# Patient Record
Sex: FEMALE | Race: WHITE | NOT HISPANIC OR LATINO
[De-identification: names, ages, dates, MRNs, and addresses within clinical notes are randomized per-mention and may not be internally consistent; named-entity substitution may affect disease eponyms.]

---

## 2017-01-27 ENCOUNTER — CHARTING TRANS (OUTPATIENT)
Dept: OTHER | Age: 77
End: 2017-01-27

## 2017-03-27 ENCOUNTER — CHARTING TRANS (OUTPATIENT)
Dept: OTHER | Age: 77
End: 2017-03-27

## 2017-05-05 ENCOUNTER — CHARTING TRANS (OUTPATIENT)
Dept: OTHER | Age: 77
End: 2017-05-05

## 2017-07-10 ENCOUNTER — CHARTING TRANS (OUTPATIENT)
Dept: OTHER | Age: 77
End: 2017-07-10

## 2017-08-15 PROCEDURE — 87086 URINE CULTURE/COLONY COUNT: CPT | Performed by: INTERNAL MEDICINE

## 2017-08-29 ENCOUNTER — CHARTING TRANS (OUTPATIENT)
Dept: OTHER | Age: 77
End: 2017-08-29

## 2017-09-01 ENCOUNTER — CHARTING TRANS (OUTPATIENT)
Dept: OTHER | Age: 77
End: 2017-09-01

## 2017-10-17 PROBLEM — Z86.73 HISTORY OF TRANSIENT ISCHEMIC ATTACK (TIA): Status: ACTIVE | Noted: 2017-10-17

## 2017-11-06 ENCOUNTER — CHARTING TRANS (OUTPATIENT)
Dept: OTHER | Age: 77
End: 2017-11-06

## 2017-11-13 PROCEDURE — 80061 LIPID PANEL: CPT | Performed by: INTERNAL MEDICINE

## 2018-01-01 ENCOUNTER — APPOINTMENT (OUTPATIENT)
Dept: CT IMAGING | Facility: HOSPITAL | Age: 78
DRG: 644 | End: 2018-01-01
Attending: EMERGENCY MEDICINE
Payer: MEDICARE

## 2018-01-01 ENCOUNTER — TELEPHONE (OUTPATIENT)
Dept: CASE MANAGEMENT | Facility: HOSPITAL | Age: 78
End: 2018-01-01

## 2018-01-01 ENCOUNTER — HOSPITAL ENCOUNTER (EMERGENCY)
Facility: HOSPITAL | Age: 78
End: 2018-01-01
Attending: EMERGENCY MEDICINE
Payer: MEDICARE

## 2018-01-01 ENCOUNTER — APPOINTMENT (OUTPATIENT)
Dept: ULTRASOUND IMAGING | Facility: HOSPITAL | Age: 78
DRG: 644 | End: 2018-01-01
Attending: OTOLARYNGOLOGY
Payer: MEDICARE

## 2018-01-01 ENCOUNTER — NURSE ONLY (OUTPATIENT)
Dept: LAB | Age: 78
End: 2018-01-01
Attending: INTERNAL MEDICINE
Payer: MEDICARE

## 2018-01-01 ENCOUNTER — APPOINTMENT (OUTPATIENT)
Dept: ULTRASOUND IMAGING | Facility: HOSPITAL | Age: 78
DRG: 644 | End: 2018-01-01
Attending: INTERNAL MEDICINE
Payer: MEDICARE

## 2018-01-01 ENCOUNTER — APPOINTMENT (OUTPATIENT)
Dept: GENERAL RADIOLOGY | Facility: HOSPITAL | Age: 78
DRG: 644 | End: 2018-01-01
Attending: INTERNAL MEDICINE
Payer: MEDICARE

## 2018-01-01 ENCOUNTER — HOSPITAL ENCOUNTER (INPATIENT)
Facility: HOSPITAL | Age: 78
LOS: 2 days | Discharge: HOME | DRG: 644 | End: 2018-01-01
Attending: INTERNAL MEDICINE | Admitting: INTERNAL MEDICINE
Payer: MEDICARE

## 2018-01-01 ENCOUNTER — HOSPITAL ENCOUNTER (INPATIENT)
Facility: HOSPITAL | Age: 78
LOS: 3 days | Discharge: HOME | DRG: 644 | End: 2018-01-01
Attending: EMERGENCY MEDICINE | Admitting: HOSPITALIST
Payer: MEDICARE

## 2018-01-01 VITALS
OXYGEN SATURATION: 94 % | DIASTOLIC BLOOD PRESSURE: 65 MMHG | WEIGHT: 197.31 LBS | HEART RATE: 94 BPM | SYSTOLIC BLOOD PRESSURE: 152 MMHG | RESPIRATION RATE: 18 BRPM | TEMPERATURE: 98 F | BODY MASS INDEX: 40 KG/M2

## 2018-01-01 VITALS
HEIGHT: 60 IN | HEART RATE: 78 BPM | SYSTOLIC BLOOD PRESSURE: 151 MMHG | WEIGHT: 210.31 LBS | BODY MASS INDEX: 41.29 KG/M2 | DIASTOLIC BLOOD PRESSURE: 63 MMHG | OXYGEN SATURATION: 91 % | RESPIRATION RATE: 20 BRPM | TEMPERATURE: 98 F

## 2018-01-01 DIAGNOSIS — I87.1 SUPERIOR VENA CAVA SYNDROME: ICD-10-CM

## 2018-01-01 DIAGNOSIS — R69 DIAGNOSIS UNKNOWN: Primary | ICD-10-CM

## 2018-01-01 DIAGNOSIS — N28.9 RENAL INSUFFICIENCY: ICD-10-CM

## 2018-01-01 DIAGNOSIS — R91.8 PULMONARY MASS: Primary | ICD-10-CM

## 2018-01-01 DIAGNOSIS — N17.9 AKI (ACUTE KIDNEY INJURY) (HCC): ICD-10-CM

## 2018-01-01 DIAGNOSIS — R09.02 HYPOXIA: ICD-10-CM

## 2018-01-01 DIAGNOSIS — R53.1 WEAKNESS: ICD-10-CM

## 2018-01-01 DIAGNOSIS — D72.829 LEUKOCYTOSIS, UNSPECIFIED TYPE: ICD-10-CM

## 2018-01-01 DIAGNOSIS — E86.0 DEHYDRATION: ICD-10-CM

## 2018-01-01 DIAGNOSIS — I10 HTN (HYPERTENSION): Primary | ICD-10-CM

## 2018-01-01 DIAGNOSIS — I46.9 CARDIAC ARREST (HCC): Primary | ICD-10-CM

## 2018-01-01 DIAGNOSIS — E87.0 HYPERNATREMIA: ICD-10-CM

## 2018-01-01 DIAGNOSIS — I63.9 STROKE (CEREBRUM) (HCC): Primary | ICD-10-CM

## 2018-01-01 DIAGNOSIS — C73 THYROID CARCINOMA (HCC): Primary | ICD-10-CM

## 2018-01-01 PROCEDURE — 83735 ASSAY OF MAGNESIUM: CPT

## 2018-01-01 PROCEDURE — 97535 SELF CARE MNGMENT TRAINING: CPT

## 2018-01-01 PROCEDURE — 85025 COMPLETE CBC W/AUTO DIFF WBC: CPT | Performed by: INTERNAL MEDICINE

## 2018-01-01 PROCEDURE — 97530 THERAPEUTIC ACTIVITIES: CPT

## 2018-01-01 PROCEDURE — 81001 URINALYSIS AUTO W/SCOPE: CPT | Performed by: INTERNAL MEDICINE

## 2018-01-01 PROCEDURE — 88342 IMHCHEM/IMCYTCHM 1ST ANTB: CPT | Performed by: INTERNAL MEDICINE

## 2018-01-01 PROCEDURE — 87086 URINE CULTURE/COLONY COUNT: CPT | Performed by: INTERNAL MEDICINE

## 2018-01-01 PROCEDURE — 99285 EMERGENCY DEPT VISIT HI MDM: CPT

## 2018-01-01 PROCEDURE — 85025 COMPLETE CBC W/AUTO DIFF WBC: CPT | Performed by: EMERGENCY MEDICINE

## 2018-01-01 PROCEDURE — 0CJY8ZZ INSPECTION OF MOUTH AND THROAT, VIA NATURAL OR ARTIFICIAL OPENING ENDOSCOPIC: ICD-10-PCS | Performed by: OTOLARYNGOLOGY

## 2018-01-01 PROCEDURE — 97165 OT EVAL LOW COMPLEX 30 MIN: CPT

## 2018-01-01 PROCEDURE — 36415 COLL VENOUS BLD VENIPUNCTURE: CPT

## 2018-01-01 PROCEDURE — 80048 BASIC METABOLIC PNL TOTAL CA: CPT

## 2018-01-01 PROCEDURE — 81001 URINALYSIS AUTO W/SCOPE: CPT

## 2018-01-01 PROCEDURE — 88381 MICRODISSECTION MANUAL: CPT | Performed by: INTERNAL MEDICINE

## 2018-01-01 PROCEDURE — 96376 TX/PRO/DX INJ SAME DRUG ADON: CPT

## 2018-01-01 PROCEDURE — 80048 BASIC METABOLIC PNL TOTAL CA: CPT | Performed by: INTERNAL MEDICINE

## 2018-01-01 PROCEDURE — 70491 CT SOFT TISSUE NECK W/DYE: CPT | Performed by: EMERGENCY MEDICINE

## 2018-01-01 PROCEDURE — 96361 HYDRATE IV INFUSION ADD-ON: CPT

## 2018-01-01 PROCEDURE — 70450 CT HEAD/BRAIN W/O DYE: CPT | Performed by: EMERGENCY MEDICINE

## 2018-01-01 PROCEDURE — 80053 COMPREHEN METABOLIC PANEL: CPT | Performed by: EMERGENCY MEDICINE

## 2018-01-01 PROCEDURE — 80053 COMPREHEN METABOLIC PANEL: CPT

## 2018-01-01 PROCEDURE — 92526 ORAL FUNCTION THERAPY: CPT

## 2018-01-01 PROCEDURE — 0GBH3ZX EXCISION OF RIGHT THYROID GLAND LOBE, PERCUTANEOUS APPROACH, DIAGNOSTIC: ICD-10-PCS | Performed by: RADIOLOGY

## 2018-01-01 PROCEDURE — 81001 URINALYSIS AUTO W/SCOPE: CPT | Performed by: EMERGENCY MEDICINE

## 2018-01-01 PROCEDURE — 92610 EVALUATE SWALLOWING FUNCTION: CPT

## 2018-01-01 PROCEDURE — 76942 ECHO GUIDE FOR BIOPSY: CPT | Performed by: OTOLARYNGOLOGY

## 2018-01-01 PROCEDURE — 84132 ASSAY OF SERUM POTASSIUM: CPT | Performed by: INTERNAL MEDICINE

## 2018-01-01 PROCEDURE — 85027 COMPLETE CBC AUTOMATED: CPT | Performed by: INTERNAL MEDICINE

## 2018-01-01 PROCEDURE — 97116 GAIT TRAINING THERAPY: CPT

## 2018-01-01 PROCEDURE — 71260 CT THORAX DX C+: CPT | Performed by: EMERGENCY MEDICINE

## 2018-01-01 PROCEDURE — 85025 COMPLETE CBC W/AUTO DIFF WBC: CPT

## 2018-01-01 PROCEDURE — 97162 PT EVAL MOD COMPLEX 30 MIN: CPT

## 2018-01-01 PROCEDURE — 85610 PROTHROMBIN TIME: CPT | Performed by: INTERNAL MEDICINE

## 2018-01-01 PROCEDURE — 87086 URINE CULTURE/COLONY COUNT: CPT

## 2018-01-01 PROCEDURE — 80053 COMPREHEN METABOLIC PANEL: CPT | Performed by: INTERNAL MEDICINE

## 2018-01-01 PROCEDURE — 20206 BIOPSY MUSCLE PERQ NEEDLE: CPT | Performed by: OTOLARYNGOLOGY

## 2018-01-01 PROCEDURE — 96374 THER/PROPH/DIAG INJ IV PUSH: CPT

## 2018-01-01 PROCEDURE — 81210 BRAF GENE: CPT | Performed by: INTERNAL MEDICINE

## 2018-01-01 PROCEDURE — 88341 IMHCHEM/IMCYTCHM EA ADD ANTB: CPT | Performed by: INTERNAL MEDICINE

## 2018-01-01 PROCEDURE — 83735 ASSAY OF MAGNESIUM: CPT | Performed by: INTERNAL MEDICINE

## 2018-01-01 PROCEDURE — 88305 TISSUE EXAM BY PATHOLOGIST: CPT | Performed by: INTERNAL MEDICINE

## 2018-01-01 PROCEDURE — 71046 X-RAY EXAM CHEST 2 VIEWS: CPT | Performed by: INTERNAL MEDICINE

## 2018-01-01 RX ORDER — SENNOSIDES 8.6 MG
17.2 TABLET ORAL 2 TIMES DAILY PRN
Status: DISCONTINUED | OUTPATIENT
Start: 2018-01-01 | End: 2018-01-01

## 2018-01-01 RX ORDER — LEVOTHYROXINE SODIUM 0.05 MG/1
100 TABLET ORAL
Status: DISCONTINUED | OUTPATIENT
Start: 2018-01-01 | End: 2018-01-01

## 2018-01-01 RX ORDER — ACETAMINOPHEN AND CODEINE PHOSPHATE 300; 30 MG/1; MG/1
1-2 TABLET ORAL EVERY 4 HOURS PRN
Qty: 30 TABLET | Refills: 0 | Status: SHIPPED | OUTPATIENT
Start: 2018-01-01

## 2018-01-01 RX ORDER — ACETAMINOPHEN 325 MG/1
650 TABLET ORAL EVERY 6 HOURS PRN
Status: DISCONTINUED | OUTPATIENT
Start: 2018-01-01 | End: 2018-01-01

## 2018-01-01 RX ORDER — PANTOPRAZOLE SODIUM 40 MG/1
40 TABLET, DELAYED RELEASE ORAL
Status: DISCONTINUED | OUTPATIENT
Start: 2018-01-01 | End: 2018-01-01

## 2018-01-01 RX ORDER — HEPARIN SODIUM 5000 [USP'U]/ML
5000 INJECTION, SOLUTION INTRAVENOUS; SUBCUTANEOUS EVERY 8 HOURS SCHEDULED
Status: DISCONTINUED | OUTPATIENT
Start: 2018-01-01 | End: 2018-01-01

## 2018-01-01 RX ORDER — ATORVASTATIN CALCIUM 20 MG/1
20 TABLET, FILM COATED ORAL NIGHTLY
Status: DISCONTINUED | OUTPATIENT
Start: 2018-01-01 | End: 2018-01-01

## 2018-01-01 RX ORDER — AMOXICILLIN AND CLAVULANATE POTASSIUM 500; 125 MG/1; MG/1
500 TABLET, FILM COATED ORAL EVERY 12 HOURS SCHEDULED
Status: DISCONTINUED | OUTPATIENT
Start: 2018-01-01 | End: 2018-01-01

## 2018-01-01 RX ORDER — LEVOTHYROXINE SODIUM 0.1 MG/1
100 TABLET ORAL
Status: DISCONTINUED | OUTPATIENT
Start: 2018-01-01 | End: 2018-01-01

## 2018-01-01 RX ORDER — DEXAMETHASONE 4 MG/1
4 TABLET ORAL EVERY 8 HOURS SCHEDULED
Qty: 30 TABLET | Refills: 0 | Status: SHIPPED | OUTPATIENT
Start: 2018-01-01

## 2018-01-01 RX ORDER — LORAZEPAM 2 MG/ML
1 INJECTION INTRAMUSCULAR ONCE
Status: COMPLETED | OUTPATIENT
Start: 2018-01-01 | End: 2018-01-01

## 2018-01-01 RX ORDER — ACETAMINOPHEN AND CODEINE PHOSPHATE 300; 30 MG/1; MG/1
1 TABLET ORAL EVERY 4 HOURS PRN
Status: DISCONTINUED | OUTPATIENT
Start: 2018-01-01 | End: 2018-01-01

## 2018-01-01 RX ORDER — FUROSEMIDE 20 MG/1
20 TABLET ORAL
Status: DISCONTINUED | OUTPATIENT
Start: 2018-01-01 | End: 2018-01-01

## 2018-01-01 RX ORDER — LORAZEPAM 0.5 MG/1
0.5 TABLET ORAL EVERY 6 HOURS PRN
Qty: 30 TABLET | Refills: 1 | Status: SHIPPED | OUTPATIENT
Start: 2018-01-01 | End: 2018-01-01

## 2018-01-01 RX ORDER — METOPROLOL TARTRATE 5 MG/5ML
5 INJECTION INTRAVENOUS EVERY 6 HOURS PRN
Status: DISCONTINUED | OUTPATIENT
Start: 2018-01-01 | End: 2018-01-01

## 2018-01-01 RX ORDER — MORPHINE SULFATE 4 MG/ML
2 INJECTION, SOLUTION INTRAMUSCULAR; INTRAVENOUS ONCE
Status: COMPLETED | OUTPATIENT
Start: 2018-01-01 | End: 2018-01-01

## 2018-01-01 RX ORDER — MORPHINE SULFATE 4 MG/ML
4 INJECTION, SOLUTION INTRAMUSCULAR; INTRAVENOUS EVERY 2 HOUR PRN
Status: DISCONTINUED | OUTPATIENT
Start: 2018-01-01 | End: 2018-01-01

## 2018-01-01 RX ORDER — DILTIAZEM HYDROCHLORIDE 180 MG/1
180 CAPSULE, EXTENDED RELEASE ORAL DAILY
Status: DISCONTINUED | OUTPATIENT
Start: 2018-01-01 | End: 2018-01-01

## 2018-01-01 RX ORDER — HYDRALAZINE HYDROCHLORIDE 20 MG/ML
10 INJECTION INTRAMUSCULAR; INTRAVENOUS EVERY 6 HOURS PRN
Status: DISCONTINUED | OUTPATIENT
Start: 2018-01-01 | End: 2018-01-01

## 2018-01-01 RX ORDER — LOSARTAN POTASSIUM 100 MG/1
100 TABLET ORAL DAILY
Status: DISCONTINUED | OUTPATIENT
Start: 2018-01-01 | End: 2018-01-01

## 2018-01-01 RX ORDER — ACETAMINOPHEN 650 MG/1
650 SUPPOSITORY RECTAL EVERY 4 HOURS PRN
Status: DISCONTINUED | OUTPATIENT
Start: 2018-01-01 | End: 2018-01-01

## 2018-01-01 RX ORDER — METOCLOPRAMIDE HYDROCHLORIDE 5 MG/ML
5 INJECTION INTRAMUSCULAR; INTRAVENOUS EVERY 8 HOURS PRN
Status: DISCONTINUED | OUTPATIENT
Start: 2018-01-01 | End: 2018-01-01

## 2018-01-01 RX ORDER — ONDANSETRON 2 MG/ML
4 INJECTION INTRAMUSCULAR; INTRAVENOUS EVERY 4 HOURS PRN
Status: DISCONTINUED | OUTPATIENT
Start: 2018-01-01 | End: 2018-01-01

## 2018-01-01 RX ORDER — PANTOPRAZOLE SODIUM 40 MG/1
40 TABLET, DELAYED RELEASE ORAL
COMMUNITY

## 2018-01-01 RX ORDER — ONDANSETRON 4 MG/1
4 TABLET, ORALLY DISINTEGRATING ORAL EVERY 8 HOURS PRN
Qty: 30 TABLET | Refills: 0 | Status: SHIPPED | OUTPATIENT
Start: 2018-01-01

## 2018-01-01 RX ORDER — SODIUM CHLORIDE 9 MG/ML
INJECTION, SOLUTION INTRAVENOUS CONTINUOUS
Status: ACTIVE | OUTPATIENT
Start: 2018-01-01 | End: 2018-01-01

## 2018-01-01 RX ORDER — HYDROXYUREA 500 MG/1
500 CAPSULE ORAL NIGHTLY
Status: DISCONTINUED | OUTPATIENT
Start: 2018-01-01 | End: 2018-01-01

## 2018-01-01 RX ORDER — MORPHINE SULFATE 4 MG/ML
2 INJECTION, SOLUTION INTRAMUSCULAR; INTRAVENOUS EVERY 2 HOUR PRN
Status: DISCONTINUED | OUTPATIENT
Start: 2018-01-01 | End: 2018-01-01

## 2018-01-01 RX ORDER — ACETAMINOPHEN 325 MG/1
650 TABLET ORAL EVERY 4 HOURS PRN
Status: DISCONTINUED | OUTPATIENT
Start: 2018-01-01 | End: 2018-01-01

## 2018-01-01 RX ORDER — SODIUM CHLORIDE 9 MG/ML
125 INJECTION, SOLUTION INTRAVENOUS CONTINUOUS
Status: DISCONTINUED | OUTPATIENT
Start: 2018-01-01 | End: 2018-01-01

## 2018-01-01 RX ORDER — ACETAMINOPHEN 160 MG/5ML
650 SOLUTION ORAL EVERY 4 HOURS PRN
Status: DISCONTINUED | OUTPATIENT
Start: 2018-01-01 | End: 2018-01-01

## 2018-01-01 RX ORDER — SODIUM CHLORIDE 450 MG/100ML
INJECTION, SOLUTION INTRAVENOUS CONTINUOUS
Status: DISCONTINUED | OUTPATIENT
Start: 2018-01-01 | End: 2018-01-01

## 2018-01-01 RX ORDER — SENNOSIDES 8.6 MG
17.2 TABLET ORAL 2 TIMES DAILY
COMMUNITY

## 2018-01-01 RX ORDER — METOPROLOL TARTRATE 5 MG/5ML
5 INJECTION INTRAVENOUS ONCE
Status: COMPLETED | OUTPATIENT
Start: 2018-01-01 | End: 2018-01-01

## 2018-01-01 RX ORDER — ACETAMINOPHEN AND CODEINE PHOSPHATE 120; 12 MG/5ML; MG/5ML
12.5 SOLUTION ORAL EVERY 4 HOURS PRN
Status: DISCONTINUED | OUTPATIENT
Start: 2018-01-01 | End: 2018-01-01

## 2018-01-01 RX ORDER — ONDANSETRON 2 MG/ML
4 INJECTION INTRAMUSCULAR; INTRAVENOUS EVERY 6 HOURS PRN
Status: DISCONTINUED | OUTPATIENT
Start: 2018-01-01 | End: 2018-01-01

## 2018-01-01 RX ORDER — DEXAMETHASONE 4 MG/1
4 TABLET ORAL EVERY 8 HOURS SCHEDULED
Status: DISCONTINUED | OUTPATIENT
Start: 2018-01-01 | End: 2018-01-01

## 2018-01-01 RX ORDER — SODIUM CHLORIDE 9 MG/ML
INJECTION, SOLUTION INTRAVENOUS CONTINUOUS
Status: DISCONTINUED | OUTPATIENT
Start: 2018-01-01 | End: 2018-01-01

## 2018-01-01 RX ORDER — CHLORAL HYDRATE 500 MG
1000 CAPSULE ORAL 2 TIMES DAILY
Status: DISCONTINUED | OUTPATIENT
Start: 2018-01-01 | End: 2018-01-01 | Stop reason: RX

## 2018-01-01 RX ORDER — MORPHINE SULFATE 4 MG/ML
1 INJECTION, SOLUTION INTRAMUSCULAR; INTRAVENOUS EVERY 2 HOUR PRN
Status: DISCONTINUED | OUTPATIENT
Start: 2018-01-01 | End: 2018-01-01

## 2018-01-01 RX ORDER — AMOXICILLIN AND CLAVULANATE POTASSIUM 875; 125 MG/1; MG/1
1 TABLET, FILM COATED ORAL 2 TIMES DAILY
Status: ON HOLD | COMMUNITY
End: 2018-01-01

## 2018-01-01 RX ORDER — ACETAMINOPHEN AND CODEINE PHOSPHATE 300; 30 MG/1; MG/1
1 TABLET ORAL EVERY 4 HOURS PRN
Status: ON HOLD | COMMUNITY
End: 2018-01-01

## 2018-01-01 RX ORDER — ACETAMINOPHEN AND CODEINE PHOSPHATE 300; 30 MG/1; MG/1
1 TABLET ORAL EVERY 4 HOURS PRN
Qty: 28 TABLET | Refills: 0 | Status: SHIPPED | OUTPATIENT
Start: 2018-01-01

## 2018-01-01 RX ORDER — SENNOSIDES 8.6 MG
17.2 TABLET ORAL 2 TIMES DAILY
Status: DISCONTINUED | OUTPATIENT
Start: 2018-01-01 | End: 2018-01-01

## 2018-02-10 ENCOUNTER — CHARTING TRANS (OUTPATIENT)
Dept: OTHER | Age: 78
End: 2018-02-10

## 2018-02-27 PROBLEM — E03.9 ACQUIRED HYPOTHYROIDISM: Status: ACTIVE | Noted: 2018-01-01

## 2018-02-27 PROBLEM — F32.0 MILD MAJOR DEPRESSION, SINGLE EPISODE (HCC): Status: ACTIVE | Noted: 2018-01-01

## 2018-06-21 PROBLEM — F32.0 MILD MAJOR DEPRESSION, SINGLE EPISODE (HCC): Status: RESOLVED | Noted: 2018-01-01 | Resolved: 2018-01-01

## 2018-07-09 PROBLEM — E66.01 MORBID OBESITY WITH BMI OF 40.0-44.9, ADULT (HCC): Status: ACTIVE | Noted: 2018-01-01

## 2018-07-09 PROBLEM — M25.561 CHRONIC PAIN OF BOTH KNEES: Status: ACTIVE | Noted: 2018-01-01

## 2018-07-09 PROBLEM — M62.81 MUSCLE WEAKNESS: Status: ACTIVE | Noted: 2018-01-01

## 2018-07-09 PROBLEM — M25.562 CHRONIC PAIN OF BOTH KNEES: Status: ACTIVE | Noted: 2018-01-01

## 2018-07-09 PROBLEM — G89.29 CHRONIC PAIN OF BOTH KNEES: Status: ACTIVE | Noted: 2018-01-01

## 2018-10-19 ENCOUNTER — CHARTING TRANS (OUTPATIENT)
Dept: OTHER | Age: 78
End: 2018-10-19

## 2018-10-22 ENCOUNTER — CHARTING TRANS (OUTPATIENT)
Dept: OTHER | Age: 78
End: 2018-10-22

## 2018-10-23 ENCOUNTER — CHARTING TRANS (OUTPATIENT)
Dept: OTHER | Age: 78
End: 2018-10-23

## 2018-10-23 ENCOUNTER — LAB SERVICES (OUTPATIENT)
Dept: OTHER | Age: 78
End: 2018-10-23

## 2018-10-23 PROBLEM — R53.1 WEAKNESS: Status: ACTIVE | Noted: 2018-01-01

## 2018-10-23 LAB
A/G RATIO: 0.97 (ref 1.1–2.4)
ALANINE AMINOTRANSFE: 16 U/L (ref 7–52)
ALBUMIN, SERUM (ALB): 3.4 G/DL (ref 3.5–5.7)
ALKALINE PHOSPHATASE (ALK): 78 U/L (ref 34–104)
ANION GAP: 7 MEQ/L (ref 8–16)
ASPARTATE AMINOTRANS: 12 U/L (ref 13–39)
BASOPHIL AUTOMATED: 1.1 %
BASOPHILS: 0.2 (ref 0–0.2)
BILIRUBIN, TOTAL: 0.5 MG/DL (ref 0.2–0.8)
BLOOD UREA NITROGEN (BUN): 30 MG/DL (ref 7–25)
BUN/CREATININE RATIO: 22.1 (ref 7.4–23)
CALCIUM, SERUM: 8.8 MG/DL (ref 8.6–10.3)
CARBON DIOXIDE: 30 MMOL/L (ref 21–31)
CHLORIDE, SERUM: 106 MMOL/L (ref 98–107)
CREATININE: 1.36 MG/DL (ref 0.6–1.2)
EOSINOPHIL AUTOMATED: 6.8 %
EOSINOPHILS: 1.1 (ref 0–0.5)
EST GLOMERULAR FILTRATION RATE: 37 1.73M SQ
GLUCOSE P FAST SERPL-MCNC: 112 MG/DL (ref 70–99)
HEMATOCRIT: 36.9 % (ref 34.7–45.1)
HEMOGLOBIN: 12.1 GM/DL (ref 12–15.3)
K (POTASSIUM, SERUM): 3.7 MMOL/L (ref 3.5–5.1)
LYMPHOCYTE AUTOMATED: 9 %
LYMPHOCYTES: 1.5 (ref 0.6–3.4)
MEAN CORPUSCULAR HGB: 31.3 PG (ref 26–34)
MEAN CORPUSCULAR HGB: 32.9 G/DL (ref 32.5–35.8)
MEAN CORPUSCULAR VOL: 95.1 FL (ref 80–100)
MEAN PLATELET VOLUME: 7.7 FL (ref 6.8–10.2)
MG (MAGNESIUM, SERUM: 2.4 MG/DL (ref 1.6–2.6)
MONOCYTE AUTOMATED: 7.4 %
MONOCYTES: 1.2 (ref 0.3–1)
NA (SODIUM, SERUM): 143 MMOL/L (ref 133–144)
NEUTROPHIL ABSOLUTE: 12.2 (ref 1.7–7.7)
NEUTROPHIL AUTOMATED: 75.7 %
PHOSPHORUS, SERUM: 4.5 MG/DL (ref 2.5–4.5)
PLATELET COUNT: 373 K/MM3 (ref 150–450)
PROTEIN TOTAL: 6.9 G/DL (ref 6.4–8.9)
RED BLOOD CELL COUNT: 3.88 M/MM3 (ref 3.63–5.04)
RED CELL DISTRIBUTIO: 16.6 % (ref 11.9–15.9)
WHITE BLOOD CELL COU: 16.2 K/MM3 (ref 4–11)

## 2018-10-24 NOTE — PROGRESS NOTES
NURSING ADMISSION NOTE    Patient admitted via Cart  Oriented to room. Safety precautions initiated. Bed in low position. Call light in reach. Completed admission assessment with patient and family. ENT notified of consult.  Oncology to be notified

## 2018-10-24 NOTE — PROCEDURES
Nasopharyngoscopy Procedure Note (40644)    Due to inability for adequate examination of the nasal cavity and pharynx and need for magnification to perform the examination, endoscopy was performed.  Risks and benefits were discussed with patient/family an

## 2018-10-24 NOTE — CONSULTS
[facility]  Report of Consultation    Natalie Magana Patient Status:  Inpatient    1940 MRN [id number]   [facility] 3NE-A Attending Serena Salgado MD   [address] Day # 1 PCP Juno Martinez MD     Reason for Consultation:  Manuela Aguilar mcg, 100 mcg, Oral, Before breakfast  •  losartan (COZAAR) tab 100 mg, 100 mg, Oral, Daily  •  magnesium hydroxide (MILK OF MAGNESIA) 400 MG/5ML suspension 30 mL, 30 mL, Oral, Daily PRN  •  metoprolol Tartrate (LOPRESSOR) tab 25 mg, 25 mg, Oral, Nightly  •

## 2018-10-24 NOTE — PROGRESS NOTES
[facility] Pharmacy Note:  Renal Dose Adjustment for Metoclopramide (REGLAN)    Mendy Florez has been prescribed Metoclopramide (REGLAN) 10 mg every 8 hours as needed for nausea/vomiting.     Est CrCl 23.7 ml/min based on 10/23/2018 SCr 1.36 mg/dL (per paper

## 2018-10-24 NOTE — PHYSICAL THERAPY NOTE
Attempted x2 to see patient. Both times she was unavailable. Will attempt as schedule permits. Did observe patient ambulating with a walker in her room .

## 2018-10-24 NOTE — PROGRESS NOTES
[facility] Pharmacy Note:  Renal Adjustment for amoxicillin/clavulonate (AUGMENTIN)    Chip Elkins is a [de-identified]year old female who has been prescribed amoxicillin/clavulonate (AUGMENTIN) 500 mg oral every 8 hrs.   Est CrCl is 23.7 ml/min based on 10/23/2018 SCr

## 2018-10-24 NOTE — CONSULTS
[facility]  Report of Consultation    Rina Kenny Patient Status:  Inpatient    1940 MRN [id number]   [facility] 3NE-A Attending Millicent Cartagena MD   Hosp Day # 1 PCP Elicia Bamberger, MD     Reason for Consultation:  Aarti Harrison Intravenous, Q8H PRN  •  acetaminophen-codeine (TYLENOL #3) 120-12 MG/5ML solution 12.5 mL, 12.5 mL, Oral, Q4H PRN  •  atorvastatin (LIPITOR) tab 20 mg, 20 mg, Oral, Nightly  •  diltiazem (CARDIZEM CD) 24 hr cap 180 mg, 180 mg, Oral, Daily  •  hydroxyurea 15.5*   PLT  348.0     Recent Labs   Lab  10/24/18   0641   GLU  141*   BUN  30*   CREATSERUM  1.40*   GFRAA  41*   GFRNAA  36*   CA  8.8   NA  146*   K  3.6   CL  109   CO2  29.0     Recent Labs   Lab  10/24/18   0641   PTP  20.0*   INR  1.64*         Ina

## 2018-10-24 NOTE — PROGRESS NOTES
A/Ox4. 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 On RA. Tele-NSR, hx: Mitesh, takes Eliquis. Currently med on hold for possible US core needle bx this afternoon or tomorrow. ENT following.   Oncology consulted, awaiting eval/i

## 2018-10-24 NOTE — CONSULTS
[facility]  Report of Consultation    Doreen Cobian Patient Status:  Inpatient    1940 MRN [id number]   [facility] 3NE-A Attending Adrian Arciniega MD   Hosp Day # 1 PCP Thania Cazares MD     Reason for Consultation:  R thyr mediastinal/paratracheal mass 8.7 cm that extends into lower neck and inferior pole of R thyroid. Mass is causing compression and occlusion of SVC with collaterals. Small pericardial and pleural effusions as well as small lung nodules noted on imaging. 100 mcg, Oral, Before breakfast  •  losartan (COZAAR) tab 100 mg, 100 mg, Oral, Daily  •  magnesium hydroxide (MILK OF MAGNESIA) 400 MG/5ML suspension 30 mL, 30 mL, Oral, Daily PRN  •  metoprolol Tartrate (LOPRESSOR) tab 25 mg, 25 mg, Oral, Nightly  •  Pan extremity     Thrombocytosis (HCC)     History of pulmonary embolism     Essential thrombocythemia ([address]     History of transient ischemic attack (TIA)     Acquired hypothyroidism     Chronic pain of both knees     Muscle weakness     Morbid obesity with B

## 2018-10-24 NOTE — H&P
[facility] Hospitalist H&P       CC: thyroid mass     PCP: Richie Tovar MD    History of Present Illness:  Pt is an [de-identified] F with hx PE on xarelto, HTN, HLD, hypothyroidism who presents to [facility] as transfer from [facility] for further w/u and management of t Encounter):  Pantoprazole Sodium 40 MG Oral Tab EC Take 40 mg by mouth every morning before breakfast. Disp:  Rfl:    magnesium hydroxide 400 MG/5ML Oral Suspension Take 30 mL by mouth daily as needed for constipation.  Disp:  Rfl:    Senna 8.6 MG Oral Tab 143/61  160/69 (!) 167/84   BP Location: Left arm  Left arm Left arm   Pulse: 80  88 82   Resp: 16  16 18   Temp: 98.1 °F (36.7 °C)  97.8 °F (36.6 °C) 97.9 °F (36.6 °C)   TempSrc: Oral  Oral Oral   SpO2: 90% 94% 95% (!) 89%   Weight:           Wt Readings will start 1/2 NS as Na is 146  - monitor BMP  - possible renal c/s in AM    # Leukocyotsis  - possibly 2/2 above?  - was treated for UTI at OSH, s/p 7 day course, will d/c augmentin  - CXR with nodular airspace consolidation R base - possible PNA vs neopl

## 2018-10-24 NOTE — PROCEDURES
[facility]  Procedure Note    Cody Teenapennie Patient Status:  Inpatient    1940 MRN [id number]   [facility] 3NE-A Attending Sudarshan Grimes MD   [facility] Day # 1 PCP Nakia Mccarthy MD     Procedure: US thyroid biopsy R    Pre-Pr

## 2018-10-24 NOTE — IMAGING NOTE
Dr Lawanda To interventional radiologist made aware pt was on xarelto for hx of PE, with her last dose being 10/23 @ [id number]. Plan to proceed with biopsy @ 1300.

## 2018-10-24 NOTE — PLAN OF CARE
Problem: Impaired Swallowing  Goal: Minimize aspiration risk  Interventions:  - Regular solids; Thin only by spoon with chin tuck; Nectar thick by cup with small, single sips.    - Patient should be alert and upright for all feedings (90 degrees preferred)

## 2018-10-24 NOTE — SLP NOTE
ADULT SWALLOWING EVALUATION    ASSESSMENT    ASSESSMENT/OVERALL IMPRESSION:  Order received for bedside swallow evaluation. Pt is an [de-identified]year old year old female admitted from [facility] after presenting with headache, weakness, and cystitis.  Hx of meta aspiration precautions and plan with pt and family. Answered questions to their apparent satisfaction with good understanding reported. Will follow up to ensure safety with diet and educate pt on compensatory strategies/swallow precautions.  Video remedios Limits  Strength: Within Functional Limits  Tone: Within Functional Limits  Range of Motion: Within Functional Limits  Rate of Motion: Within Functional Limits    Voice Quality: Hoarse;Weak  Respiratory Status: Unlabored  Consistencies Trialed:  Thin liquid

## 2018-10-25 PROBLEM — R09.02 HYPOXIA: Status: ACTIVE | Noted: 2018-01-01

## 2018-10-25 PROBLEM — J98.59 MEDIASTINAL MASS: Status: ACTIVE | Noted: 2018-01-01

## 2018-10-25 PROBLEM — N17.9 AKI (ACUTE KIDNEY INJURY) (HCC): Status: ACTIVE | Noted: 2018-01-01

## 2018-10-25 PROBLEM — R91.8 PULMONARY MASS: Status: ACTIVE | Noted: 2018-01-01

## 2018-10-25 NOTE — PLAN OF CARE
Problem: Impaired Swallowing  Goal: Minimize aspiration risk  Interventions:  - Thin liquids only by spoon and with chin tuck, nectar thick by cup, regular solids.   - Patient should be alert and upright for all feedings (90 degrees preferred)  - Offer jordan

## 2018-10-25 NOTE — PROGRESS NOTES
rn to document to following for home o2:  O2 Evaluation:     SPO2 _88__% on room air at rest.     SPO2 _93__% on 2___liters O2 NC Per Minute at rest.     SPO2 _86__% with ambulation on room air.     SPO2 _88__% with ambulation on  2___ liters O2 NC per min

## 2018-10-25 NOTE — CM/SW NOTE
Per unit RN, pt to d/c home with home O2. SW sent DME order for the O2 to [facility]. [facility] has accepted. Unit RN to contact RT and have them bring a Jumia tank to Pt's room for d/c.  Pt/ family to call [facility] at [phone] to adelfou

## 2018-10-25 NOTE — PROGRESS NOTES
10/25/18 143   Clinical Encounter Type   Visited With Patient and family together  (spouse and daughter)    responded to the POLST consult. Checked in with the RN Tete regarding patient's condition/decisional capacity.  The daughter do not wa

## 2018-10-25 NOTE — DISCHARGE SUMMARY
General Medicine Discharge Summary     Patient ID:  Cherry Castle  [de-identified]year old  4/16/1940    Admit date: 10/23/2018    Discharge date and time: 10/25/2018      Attending Physician: David Valdez MD     Primary Care Physician: Brian Conway MD LAD, and small R pleural effusion  - will order home O2 with ambulation   - PCP/onc to consider pulm consult as outpatient    # Hx PE   - resume xarelto (held for procedure)     # paroxysmal a fib  - continue home dilt and metoprolol  - xarelto     # Ron Cooley Tab  Take 25 mg by mouth nightly. CARTIA  MG Oral Capsule SR 24 Hr  TAKE ONE CAPSULE BY MOUTH ONE TIME DAILY     Cholecalciferol (VITAMIN D-3) 1000 UNITS Oral Cap  Take 1,000 Units by mouth daily.     omega-3 fatty acids 1000 MG Oral Cap  Take 1,

## 2018-10-25 NOTE — SLP NOTE
SPEECH DAILY NOTE - INPATIENT    ASSESSMENT & PLAN   ASSESSMENT  Pt seen for dysphagia tx to assess tolerance with recommended diet, ensure appropriate utilization of aspiration precautions, and provide pt/family education.   Pt found sitting upright in antonia understanding and implementation of aspiration precautions and swallow strategies independently over 1-2 session(s).    Progressing   Goal #3 The patient will utilize compensatory strategies as outlined by  BSSE (clinical evaluation) including Slow rate, S

## 2018-10-25 NOTE — PROGRESS NOTES
Oncology progress note--    Shanda was seen again today with her family, inclusive of her . Bx right neck went pretty smoothly yesterday, all considered. No bleeding and she is now back on Elquis without issues. No fever, chills, sweats, n/v/c/d.

## 2018-10-25 NOTE — PLAN OF CARE
GASTROINTESTINAL - ADULT    • Minimal or absence of nausea and vomiting Progressing    • Maintains or returns to baseline bowel function Progressing    • Maintains adequate nutritional intake (undernourished) Progressing        Impaired Swallowing    • Min

## 2018-10-25 NOTE — CM/SW NOTE
Pt recommending home health, primary rn stating patient requires home o2. Spoke with patient, spouse and son at bedside, no preference to agencies. Page to [facility].     rn to document to following for home o2:  O2 Evaluation:    SPO2 ___% on room air

## 2018-10-25 NOTE — PHYSICAL THERAPY NOTE
PHYSICAL THERAPY EVALUATION - INPATIENT     Room Number: 4358/0904-J  Evaluation Date: 10/25/2018  Type of Evaluation: Initial  Physician Order: PT Eval and Treat    Presenting Problem: weakness  Reason for Therapy: Mobility Dysfunction and Discharge limits    RANGE OF MOTION AND STRENGTH ASSESSMENT  Upper extremity ROM and strength are within functional limits WNL    Lower extremity ROM is within functional limits WNL    Lower extremity strength is within functional limits Right Hip flexion  4/5  Left PT. Pt with supervision for sit<>Stand, but required increased time to complete. Pt with reports of B knee pain. Once standing without AD, pt's balance poor+. Once pt given a RW, balance improved. Pt with kyphotic posture and with forward head posture.  Pt overall the evaluation complexity is considered moderate. These impairments and comorbidities manifest themselves as functional limitations in independent bed mobility, transfers, and gait.   The patient is below baseline and would benefit from skilled inp

## 2018-10-26 NOTE — PROGRESS NOTES
NURSING DISCHARGE NOTE    Discharged Home via Wheelchair. Accompanied by RN and Spouse  Belongings Taken by patient/family     Pt assessed and ready for discharge. Discharge instructions given and reviewed. All questions and concerns addressed.   Med

## 2018-10-26 NOTE — HOME CARE LIAISON
LATE ENTRY   [facility] REC'D REFERRAL AT THE END OF THE BUSINESS DAY ON 10/25. TNL GOT REFERRAL 10/26 AND PTNT LIVES IN OUR ASK AREA.  TNL CONTACTED [facility] 145 RE PTNT IN [facility] AREA [facility] WAS NOT ABLE TO SERVICE THIS PTNT AT HIS TIME    [address]

## 2018-10-28 PROBLEM — I87.1 SUPERIOR VENA CAVA SYNDROME: Status: ACTIVE | Noted: 2018-01-01

## 2018-10-28 PROBLEM — C73 THYROID CARCINOMA (HCC): Status: ACTIVE | Noted: 2018-01-01

## 2018-10-28 PROBLEM — E86.0 DEHYDRATION: Status: ACTIVE | Noted: 2018-01-01

## 2018-10-28 PROBLEM — E87.0 HYPERNATREMIA: Status: ACTIVE | Noted: 2018-01-01

## 2018-10-28 PROBLEM — N28.9 RENAL INSUFFICIENCY: Status: ACTIVE | Noted: 2018-01-01

## 2018-10-28 PROBLEM — D72.829 LEUKOCYTOSIS, UNSPECIFIED TYPE: Status: ACTIVE | Noted: 2018-01-01

## 2018-10-28 PROBLEM — R22.0 FACIAL SWELLING: Status: ACTIVE | Noted: 2018-01-01

## 2018-10-28 NOTE — ED PROVIDER NOTES
Patient Seen in: [facility] Emergency Department    History   Patient presents with:  Swelling Edema (cardiovascular, metabolic)    Stated Complaint: increasing swelling to right side of face, history of throat CA, more lethargic*    HPI    Patient Smokeless tobacco: Never Used    Alcohol use:  Yes      Alcohol/week: 0.0 oz      Comment: rarely    Drug use: No      Review of Systems    Positive for stated complaint: increasing swelling to right side of face, history of throat CA, more lethargic*  Othe American 40 (*)     GFR, -American 46 (*)     AST 10 (*)     Albumin 2.7 (*)     Globulin  4.9 (*)     A/G Ratio 0.6 (*)     All other components within normal limits   URINALYSIS WITH CULTURE REFLEX - Abnormal; Notable for the following components: CBC shows significantly elevated white count 17.9 with a strong left shift on differential.  Hemoglobin platelets normal  Metabolic panel shows hypernatremia likely consistent with some dehydration. Creatinine is elevated 1.4.   LFTs normal  Urinalysis notified of hospitalization  I discussed case with pulmonology.   They will consult      Disposition and Plan     Clinical Impression:  Thyroid carcinoma ([facility] [address]  (primary encounter diagnosis)  Weakness  Hypoxia  Dehydration  Hypernatremia  Renal insufficienc

## 2018-10-28 NOTE — ED INITIAL ASSESSMENT (HPI)
Patient c/o right side of face swelling and pain started yesterday. Family sts tumor on right side of neck waiting for biopsy results. Family sts she has been lethargic at home.

## 2018-10-29 NOTE — CONSULTS
[address] Jacob Patient Status:  Inpatient    1940 MRN [id number]   [facility] 4NW-A Attending Radha Lema MD   [facility] Day # 1 PCP Huan Carlson MD     Date of Admission: 10/28/2018  1:11 PM  Admission Yoly History:   reports that  has never smoked. she has never used smokeless tobacco. She reports that she drinks alcohol. She reports that she does not use drugs.      Family History:  Family History   Problem Relation Age of Onset   • Cancer Father         pro mouth daily. Disp:  Rfl:    metoprolol tartrate 12.5 mg Oral Tab Take 25 mg by mouth nightly.    Disp:  Rfl:         Current Medications:    Current Facility-Administered Medications:   •  morphINE sulfate (PF) 4 MG/ML injection 1 mg, 1 mg, Intravenous, Q2H ataxia, tremors, or vertigo  Psychiatric: denies insomnia, depression, anxiety, or drug abuse  Endocrine: denies polydypsia, polyuria, cold/heat intolerance  Hematologic/lymphatic: denies easy bruising, new blood clots, or lymphedema  Allergic/immunologic: GFRAA  41*  49*  46*   GFRNAA  36*  43*  40*   CA  8.8  8.8  9.1   ALB  2.8*   --   2.7*   NA  146*  143  146*   K  3.6  4.2  4.2  4.0   CL  109  109  109   CO2  29.0  26.0  27.0   ALKPHO  102   --   102   AST  11*   --   10*   ALT  22   --   21   BILT

## 2018-10-29 NOTE — PROGRESS NOTES
10/29/18 1109   Clinical Encounter Type   Visited With Patient and family together   Patient's Supportive Strategies/Resources Patient finds community support within the [employer].     Yazdanism Encounters   Yazdanism Needs Prayer  ( provid

## 2018-10-29 NOTE — PLAN OF CARE
PAIN - ADULT    • Verbalizes/displays adequate comfort level or patient's stated pain goal Progressing        RESPIRATORY - ADULT    • Achieves optimal ventilation and oxygenation Progressing        PT A/O, 96% ON 2L NC, RT NECK SWELLING, C/O OF RT NECK PA

## 2018-10-29 NOTE — CM/SW NOTE
Patient failed inpatient criteria. Second level of review completed and supports observation. UR committee in agreement. Discussed with Dr. Sam Arrieta who approves observation status. Observation letter given to the patient and order written.

## 2018-10-29 NOTE — CM/SW NOTE
10/29/18 1400   CM/SW Referral Data   Referral Source Social Work (self-referral)   Reason for Referral Discharge planning   Informant Patient;Spouse   Readmission Assessment   Was full assessment completed by SW/CM on prior admission?  Yes   Patient Inf

## 2018-10-29 NOTE — PROGRESS NOTES
Pharmacy Note: Dietary Supplement Discontinuation Per Policy    Fish oil has been discontinued on [place] per policy [id number]. This supplement may be restarted upon discharge using the medication reconciliation process.     Thank you,   Jilda Kawasaki

## 2018-10-29 NOTE — PROGRESS NOTES
[facility] Hospitalist Progress Note     PCP: Silverio Rogers MD    Chief Complaint: follow-up    Overnight/Interim Events:      SUBJECTIVE:  Pt laying in bed. Sons/ at bedside, d/w them. She's on 1L. +nausea. No BM today/yesterday, not eating much.      1 Nightly   • Levothyroxine Sodium  100 mcg Oral Before breakfast   • apixaban  5 mg Oral BID   • metoprolol tartrate  25 mg Oral Nightly   • Pantoprazole Sodium  40 mg Oral QAM AC   • Senna  17.2 mg Oral BID     • sodium chloride 83 mL/hr at 10/29/18 1523 nutrition eval, ensure/supplments prn  -difficult to swallow, explained will get worse before it gets better c XRT also    Prophy: eliquis     Dispo: med/onc  -lives c , PT eval    DNR (I confirmed with patient and family, signed paperwork)      Rosita Kimble

## 2018-10-29 NOTE — SLP NOTE
Order received for bedside swallow evaluation; pt familiar to this dept following recent evaluation on 10/25/18.   Per RN, son is requesting po diet be initiated without repeat bedside swallow evaluation d/t recent date of previous exam and pt tolerating sa

## 2018-10-29 NOTE — CONSULTS
[facility]  Report of Consultation    Mel Mcneal Patient Status:  Inpatient    1940 MRN [id number]   [facility] 4NW-A Attending Mk Green MD   Hosp Day # 0 PCP Leo Zhang MD     Reason for Consultation:  Patient k tablet, Oral, Q4H PRN  •  atorvastatin (LIPITOR) tab 20 mg, 20 mg, Oral, Nightly  •  [START ON 10/29/2018] diltiazem (CARDIZEM CD) 24 hr cap 180 mg, 180 mg, Oral, Daily  •  [START ON 10/29/2018] cholecalciferol (VITAMIN D3) cap/tab 1,000 Units, 1,000 Units 38.0   MCV  101.3*  101.5*  99.7   MCH  30.9  31.1  31.5   MCHC  30.5*  30.7*  31.6   RDW  16.2*  16.1*  16.2*   NEPRELIM  13.20*  13.34*  15.28*   WBC  15.5*  16.0*  17.9*   PLT  348.0  368.0  370.0     Recent Labs   Lab  10/24/18   0641  10/25/18   0659

## 2018-10-29 NOTE — DIETARY NOTE
NUTRITION INITIAL ASSESSMENT    Pt is at moderate nutrition risk. Pt does not meet malnutrition criteria.     NUTRITION DIAGNOSIS/PROBLEM:    Increased nutrient needs (calories, protein) related to increased demand of catabolic illness as evidenced by [employer] Mass: BMI 41.1     2.  Fluid Accumulation: none noted    NUTRITION PRESCRIPTION:  Calories: 4368-1331 calories/day (15-20 calories per kg)  Protein: 67-90 grams protein/day (1.5-2 grams protein per kg/IBW)  Fluid: ~1 ml/kcal or per MD discretion    MONITOR

## 2018-10-29 NOTE — PLAN OF CARE
Patient recently had a SLP eval on 10/25/18. Patient approved for regular diet with thin liquids by spoon, nectar thick by cup. Patient's son expressed concern over having another eval prior to a diet order.  He reports patient not having problems with re

## 2018-10-29 NOTE — H&P
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 Dose reduction techniques were used. Dose information is transmitted to the [facility] [employer] of Radiology) NRDR ([address]) which includes the Dose Index Registry.   PATIENT STATED HISTO superior  superior vena cava to the right. The total lesion at the level of thoracic inlet measures 6.7 x 6.6 cm.   In the coronal plane the mass is estimated to measure approximately 11 cm cephalocaudal including the neck and mediastinum by about 6.7 cm w be benign or malignant. Hyper enhancing 1.9 x 1.8 cm nodule within the tip of the right lobe of the liver also a nonspecific could be benign or malignant.       Dictated by: Mane Beyer MD on 10/28/2018 at 16:26     Approved by: Mane Beyer MD

## 2018-10-29 NOTE — PHYSICAL THERAPY NOTE
PHYSICAL THERAPY EVALUATION - INPATIENT     Room Number: 421/421-A  Evaluation Date: 10/29/2018  Type of Evaluation: Initial  Physician Order: PT Eval and Treat    Presenting Problem: thyroid carcinoma  Reason for Therapy: Mobility Dysfunction and Discharg ambulation, navigating stairs independently. Per pt and spouse, pt has been having increasing difficulty navigating stairs in recent weeks. Pt sleeps in recliner. She was independent with ADLs, cooking, cleaning. SUBJECTIVE  \"I don't think I can walk. Climbing 3-5 steps with a railing?: A Lot       AM-PAC Score:  Raw Score: 20   PT Approx Degree of Impairment Score: 35.83%   Standardized Score (AM-PAC Scale): 47.67   CMS Modifier (G-Code): CJ    FUNCTIONAL ABILITY STATUS  Gait Assessment   Gait Assistan hypothyroidism. In this PT evaluation, the patient presents with the following impairments: reduced ambulation tolerance, generalized weakness. Functional outcome measures completed include Elderly Mobility Scale (10/20).   Based on this evaluation, korin

## 2018-10-29 NOTE — CONSULTS
[facility]  Report of Consultation    Rina Kenny Patient Status:  Inpatient    1940 MRN [id number]   [facility] 4NW-A Attending Brianna Pace MD   Hosp Day # 1 PCP Elicia Bamberger, MD     Reason for Consultation:  Chela Suarez Daphine Bjorn today. She is now aware of diagnosis and generally of prognosis.         History:  Past Medical History:   Diagnosis Date   • Arrhythmia    • Deep vein thrombosis (HCC)    • Difficult intubation    • Disorder of thyroid    • Essential hypertension Oral, BID  •  metoprolol Tartrate (LOPRESSOR) tab 25 mg, 25 mg, Oral, Nightly  •  Pantoprazole Sodium (PROTONIX) EC tab 40 mg, 40 mg, Oral, QAM AC  •  Senna (SENOKOT) tab TABS 17.2 mg, 17.2 mg, Oral, BID  •  metoprolol Tartrate (LOPRESSOR) 5 MG/5ML injecti by: Tao Dillon MD on 10/25/2018 at 11:53     Approved by: Tao Dillon MD            Ct Brain Or Head ([id number])    Result Date: 10/28/2018  CONCLUSION:  No acute intracranial abnormality is identified. Chronic subcortical microvascular disease.     I was instructed to obtain follow up care and biopsy results from the referring physician.       Dictated by: Maegan Smith MD on 10/24/2018 at 14:29     Approved by: Maegan Smith MD              Right thyroid mass, core biopsy:  -POSITIVE for malignant neoplasm, (TIA)     Acquired hypothyroidism     Chronic pain of both knees     Muscle weakness     Morbid obesity with BMI of 40.0-44.9, adult (HCC)     Weakness     Pulmonary mass     Mediastinal mass     SHERON (acute kidney injury) ([facility] [address]     Hypoxia     Facial swell specialists. Thank you for allowing me to participate in the care of your patient.     Ruben Jung  10/29/2018  1:17 PM

## 2018-10-30 NOTE — CM/SW NOTE
Patient changed to inpatient status per Dr Logan Bryant. Per family, patient was inpatient at [facility] from 10/9/2018 to 10/19/2018. [address] [facility] to contact [facility] to confirm inpt days.  Plan is to discharge to [facility] on Thursday p

## 2018-10-30 NOTE — PROGRESS NOTES
[facility]  Report of Consultation    [facility] Patient Status:  Inpatient    1940 MRN [id number]   [facility] 4NW-A Attending Ambar Islsa MD   Hosp Day # 1 PCP Kaila Brice MD     Reason for Consultation:  Rosalia Ruvalcaba prostate    • Hypertension Father    • Diabetes Mother    • Hypertension Mother       reports that  has never smoked. she has never used smokeless tobacco. She reports that she drinks alcohol. She reports that she does not use drugs.     Allergies:  No Oropharynx is clear. No clear facial swellling, plethora, or cyanosis today. Neck: large right neck/upper chest mass, unchanged from last week. No erythema or fluctuance today. Chest: Clear to auscultation. Heart: Regular rate and rhythm.     Abdomen: Occlusion of the right internal jugular vein with venous collaterals via the external jugular vein into the right subclavian vein. 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   Metastatic lung disease with numerous upp Result  PAX8                                           Positive  TTF1                                            Focally Positive   CKAE1/3                                     Focally Positive  Vimentin is to be positioned upright as much as possible. 5. SEs of steroids reviewed with the family  6. Rad Onc consultation planned for tomorrow in [place],  involved to help with transport issues.     Octavio Beltran

## 2018-10-30 NOTE — PROGRESS NOTES
[facility] Hospitalist Progress Note     PCP: Saul Glynn MD    Chief Complaint: follow-up    Overnight/Interim Events:      SUBJECTIVE:  Pt laying in bed. Son/dtr/grandkids at bedside, d/w them. On 2L. 166/78. No BM. Feels congested. Dec PO intake.      OBJE hydroxyurea  500 mg Oral Nightly   • Levothyroxine Sodium  100 mcg Oral Before breakfast   • apixaban  5 mg Oral BID   • metoprolol tartrate  25 mg Oral Nightly   • Pantoprazole Sodium  40 mg Oral QAM AC   • Senna  17.2 mg Oral BID     • sodium chloride 83 GERD  - cont PPI     # ? CKD  - Cr 1.2 currently, same as at time of discharge a couple days ago  - repeat BMP in AM     FEN: nutrition eval, ensure/supplments prn  -difficult to swallow, explained will get worse before it gets better c XRT also    Prophy:

## 2018-10-30 NOTE — PHYSICAL THERAPY NOTE
IP PT attempted x 2 , per RN pt not appropriate. RN requesting therapy to attempt again later this date. Will re-attempt as appropriate and as schedule permits. IP PT attempted x 3. RN states pt is not appropriate 2/2 elevated BP.  Rn requesting to hold

## 2018-10-30 NOTE — PROGRESS NOTES
[address] [facility] Patient Status:  Inpatient    1940 MRN [id number]   [facility] 4NW-A Attending Nandini Alcantar MD   [facility] Day # 1 PCP Gearldean Collet, MD     SUBJECTIVE: no new events.   Received ativan earlier a morbidly obese  Lungs: scattered crackles, no wheezing/stridor noted  Heart: regular rate and rhythm  Abdomen: soft, non-tender; bowel sounds normal; no masses,  no organomegaly  Extremities: extremities normal, atraumatic, no cyanosis or edema           L

## 2018-10-30 NOTE — PLAN OF CARE
Patient was started on decadron 4mg, tolerated po dose with applesauce, slightly drowsy from iv ativan, swallowed without coughing,

## 2018-10-30 NOTE — OCCUPATIONAL THERAPY NOTE
OT order received, chart reviewed, attempted pt x2 this AM but pt too tired and not feeling well per RN, attempted x1 this PM and pt with elevated BP, OT will follow up tomorrow as pt able.

## 2018-10-30 NOTE — PLAN OF CARE
Impaired Functional Mobility    • Achieve highest/safest level of mobility/gait Not Progressing          Impaired Swallowing    • Minimize aspiration risk Progressing        PAIN - ADULT    • Verbalizes/displays adequate comfort level or patient's stated p

## 2018-10-30 NOTE — PLAN OF CARE
Dr Sarahy Cruz and onclogist notified that bp is still in 132 systolic and both md's notfified that patient a bloody nose during shift, not seen by this rn, received ok earlier in shift to give eliquis

## 2018-10-30 NOTE — PLAN OF CARE
Impaired Swallowing    • Minimize aspiration risk Not Progressing        PAIN - ADULT    • Verbalizes/displays adequate comfort level or patient's stated pain goal Not Progressing          Impaired Swallowing    • Minimize aspiration risk Not Progressing

## 2018-10-31 NOTE — DISCHARGE SUMMARY
[facility] Internal Medicine Discharge Summary   Patient ID:  Cherry Castle  [id number]  [de-identified]year old  4/16/1940    Admit date: 10/28/2018    Discharge date and time: 10/31/2018     Attending Physician: Carley Sung    Primary Care Physician: Breanna Vogel - pt was on home O2 and she is currently breathing comfortably with 1-2L NC in place  - Dr. Eitan Rai discussed biopsy results with family - oncology consulted  - Dr. Elsy Mills had conversation c family, I d/w him as well  - plan rad onc eval, hopefully next 1-2d, Take 1 tablet (4 mg total) by mouth every 8 (eight) hours. LORazepam 0.5 MG Tabs  Commonly known as:  ATIVAN  Take 1 tablet (0.5 mg total) by mouth every 6 (six) hours as needed for Anxiety.         CHANGE how you take these medications    * Acetaminoph metoprolol tartrate 12.5 mg Tabs  Commonly known as:  LOPRESSOR     omega-3 fatty acids 1000 MG Caps  Commonly known as:  FISH OIL     ondansetron 4 MG Tbdp  Commonly known as:  ZOFRAN ODT  Take 1 tablet (4 mg total) by mouth every 8 (eight) hours as neede PROCEDURE:  XR CHEST PA + LAT CHEST (CPT=71046)  INDICATIONS:  crackles r lung base, intermittent hypoxia  COMPARISON:  External Exams, XR CHEST AP PORTABLE  (CPT=71045), 10/16/2018, 14:46.   External Exams, CT CHEST(CONTRAST ONLY) (CPT=71260), 10/16/2018, PROCEDURE:  CT BRAIN OR HEAD (35456)  COMPARISON:  None. INDICATIONS:  increasing swelling to right side of face, history of throat CA, more lethargic than normal  TECHNIQUE:  Noncontrast CT scanning is performed through the brain.  Dose reduction techniqu PROCEDURE:  CT STN/CHEST W CONTRAST (CPT=70491/96130)  COMPARISON:  External Exams, CT CHEST(CONTRAST ONLY) (CPT=71260), 10/16/2018, 19:35.   INDICATIONS:  increasing swelling to right side of face, history of throat CA, more lethargic than normal  TECHNIQU brachiocephalic trunk and subclavian arteries on the right are patent and extends through the mass. The left common carotid artery is displaced laterally. The trachea is displaced laterally and moderately compressed.   The tumor terminates at the level of CONCLUSION:  Cervical thoracic mass presumed of thyroidal origin estimated at approximately 11 cm x 6.7 cm x 6.6 cm extending from the hyoid bone within the right side of neck into the thoracic inlet then into the mediastinum to the level of the right trac PROCEDURE:  US BIOPSY SOFT TISSUE NECK MASS(CPT=11100/26816)  COMPARISON:  None. INDICATIONS:  R thyroid mass  DESCRIPTION:  Witnessed verbal and written informed consent was obtained. Time out was performed by the staff.  An ultrasound-guided biopsy was p Patient had opportunity to ask questions and state understand and agree with therapeutic plan as outlined.  D/w MARCO Higginbotham and Dr. Emerita Bazan MD  [facility] Hospitalist  [phone]  10/31/2018  5:43 PM

## 2018-10-31 NOTE — PLAN OF CARE
Oncologist and hospitalist notified that patient was having difficulty swallowing fluids, patient has had a poor appetite, tolerates meds with applesauce.

## 2018-10-31 NOTE — CM/SW NOTE
10/31/18 1000   Discharge disposition   Expected discharge disposition Skilled Nurs   Name of [address]   Discharge transportation Private car   SW spoke w/Ofe from [facility] who contacted [facility] and confirmed

## 2018-10-31 NOTE — OCCUPATIONAL THERAPY NOTE
OCCUPATIONAL THERAPY EVALUATION - INPATIENT     Room Number: 421/421-A  Evaluation Date: 10/31/2018  Type of Evaluation: Initial  Presenting Problem: thyroid CA    Physician Order: IP Consult to Occupational Therapy  Reason for Therapy: ADL/IADL Dysfunctio No  Patient Regularly Uses: Glasses    Prior Level of Function: Mod I with SBQC for ambulation, navigating stairs independently. Per pt and spouse, pt has been having increasing difficulty navigating stairs in recent weeks. Pt sleeps in recliner.  She was i such as brushing teeth?: A Little  -   Eating meals?: None    AM-PAC Score:  Score: 16  Approx Degree of Impairment: 53.32%  Standardized Score (AM-PAC Scale): 35.96  CMS Modifier (G-Code): CK    FUNCTIONAL TRANSFER ASSESSMENT  Supine to Sit : Minimum assi performance deficits    Client Assessment/Performance Deficits LOW - No comorbidities nor modifications of tasks    Clinical Decision Making LOW - Analysis of occupational profile, problem-focused assessments, limited treatment options    Overall Complexit

## 2018-10-31 NOTE — PLAN OF CARE
Oncologist called for update on patient, md notified that patient had few scattered crackles, on o2 at 3 liters per nasal cannula, and cpox at 95%, patient able to state name.

## 2018-10-31 NOTE — PLAN OF CARE
Impaired Functional Mobility    • Achieve highest/safest level of mobility/gait Not Progressing        Impaired Swallowing    • Minimize aspiration risk Not Progressing          PAIN - ADULT    • Verbalizes/displays adequate comfort level or patient's stat

## 2018-10-31 NOTE — PHYSICAL THERAPY NOTE
PHYSICAL THERAPY TREATMENT NOTE - INPATIENT    Room Number: 421/421-A     Session: 1   Number of Visits to Meet Established Goals: 5    Presenting Problem: thyroid carcinoma    History related to current admission:     Pt was admitted from home on 10/28/20 Sitting: Fair  Dynamic Sitting: Fair           Static Standing: Fair -  Dynamic Standing: Fair -    ACTIVITY TOLERANCE                         O2 WALK  SPO2 on Room Air at Rest: 96     SPO2 Ambulation on Oxygen: 93  Ambulation oxygen flow (liters per minut bed;Needs met;Call light within reach;RN aware of session/findings; All patient questions and concerns addressed; Family present    ASSESSMENT   Pt demonstrates improved tolerance to ambulation with O2 on.  She maintained oxygen saturation while ambulating bu

## 2018-10-31 NOTE — PLAN OF CARE
Impaired Activities of Daily Living    • Achieve highest/safest level of independence in self care Progressing          Impaired Functional Mobility    • Achieve highest/safest level of mobility/gait Progressing        Pt.  Ambulating in halls w/ Physical t

## 2018-10-31 NOTE — PROGRESS NOTES
[facility]  Report of Consultation    Natalie Magana Patient Status:  Inpatient    1940 MRN [id number]   [facility] 4NW-A Attending Margorie Essex, MD   [facility] Day # 2 PCP Juno Martinez MD     Reason for Consultation:  Jessica Fleming that  has never smoked. she has never used smokeless tobacco. She reports that she drinks alcohol. She reports that she does not use drugs.     Allergies:  No Known Allergies    Medications:    Current Facility-Administered Medications:   •  dexamethasone ( right neck/upper chest mass, unchanged from last week. No erythema or fluctuance today. Chest: Clear to auscultation. Heart: Regular rate and rhythm. Abdomen: Soft, non tender with good bowel sounds. Extremities: Pedal pulses are present.  No edema into the right subclavian vein. 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   Metastatic lung disease with numerous upper lobe sub cm nodules and huge mass is lateral to the right lung root measuring 5.1 x 4.0 cm, 3.6 Focally Positive   CKAE1/3                                     Focally Positive  Vimentin                                      Positive  Calcitonin                                 Negative  CEA way through the radiation course  5. Await BRAF mutation. 6. Case to be d/w SW and will be d/w RNs today as well.     Anamika Friday

## 2018-11-15 NOTE — ED PROVIDER NOTES
Patient Seen in: [facility] Emergency Department    History   Patient presents with:  Cardiac Arrest (cardiovascular)    Stated Complaint: full arrest    HPI    25-year-old female recently admitted with widely metastatic thyroid cancer, hypertension sounds  LUNGS: No spontaneous breath sounds.   ABDOMEN: Soft, nondistended  EXTREMITIES: No peripheral edema  SKIN: Warm, dry, intact          ED Course   Labs Reviewed - No data to display               MDM   Patient's paperwork reviewed, valid DNR present

## 2018-11-15 NOTE — PROGRESS NOTES
11/15/18 0916   Clinical Encounter Type   Visited With Family   Crisis Visit Death   Patient's Supportive Strategies/Resources  Soon provided compassionate listening presence and support to the family in the midst of death.     responded

## 2018-11-23 ENCOUNTER — IMAGING SERVICES (OUTPATIENT)
Dept: OTHER | Age: 78
End: 2018-11-23

## 2018-11-28 ENCOUNTER — CHARTING TRANS (OUTPATIENT)
Dept: OTHER | Age: 78
End: 2018-11-28

## 2019-01-24 ENCOUNTER — IMAGING SERVICES (OUTPATIENT)
Dept: OTHER | Age: 79
End: 2019-01-24

## (undated) NOTE — IP AVS SNAPSHOT
[address]            (For Outpatient Use Only) Initial Admit Date: 10/28/2018   Inpt/Obs Admit Date: Inpt: 10/28/18 / Obs: 10/29/18   Discharge Date:    Hernán Bower:  [de-identified]   MRN: [de-identified]   CSN: [id number]        [id number] Subscriber ID:  Pt Rel to Subscriber:    Hospital Account Financial Class: Medicare    October 31, 2018

## (undated) NOTE — IP AVS SNAPSHOT
Patient Demographics     Address  [address] [address] Phone  [phone] [facility])  [phone] (Mobile) E-mail Address  [email or web address]      Emergency Contact(s)     Name Christoph David 1243   [phone] TAKE ONE TABLET BY MOUTH ONE TIME DAILY   Brian Conway MD         hydroxyurea 500 MG Caps  Commonly known as:  HYDREA  Next dose due:  10/31 hs      TAKE 1 CAPSULE (500 MG TOTAL) BY MOUTH ONCE DAILY.    Flushing Redhead, NP         Levothyroxine Sodium [address] DEX ROMAN [phone], [phone]  [address] [facility] [address]    Phone:  [phone]   dexamethasone 4 MG tablet     Please  your prescriptions at the location directed by your doctor or nurse    Bring a vincent Lab Results Last 24 Hours      MAGNESIUM [[id number]] (Normal)  Resulted: 10/31/18 0751, Result status: Final result   Ordering provider:   Sharon Davidson MD  10/30/18 2300 Resulting lab:  [facility]    Specimen Information    Type Source Collected On RBC 3.64 3.80 - 5.10 x10(6)uL  [facility]   HGB 11.4 12.0 - 16.0 g/dL  [facility]   HCT 36.8 34.0 - 50.0 % — [facility]   .0 150.0 - 450.0 10(3)uL — [facility]   .1 81.0 - 100.0 fL [facility]   MCH 31.3 27.0 - 33.2 pg — [facility]   MCHC 31 • History of DVT (deep vein thrombosis)     axillary vein R (per [facility])   • Hyperlipidemia    • Hypothyroid    • Nephrolithiasis    • Obesity, Class II, BMI 35-39.9    • [facility]     9/2015    • Thrombocytosis ([facility] Lab  10/24/18   0641  10/25/18   0855  10/28/18   1352   WBC  15.5*  16.0*  17.9*   HGB  11.9*  12.6  12.0   MCV  101.3*  101.5*  99.7   PLT  348.0  368.0  370.0   INR  1.64*   --    --        Recent Labs   Lab  10/24/18   0641  10/25/18   0659  10/28/18 through the external jugular on the right within the neck which ultimately communicate with the right subclavian vein. This lesion is external to the hypopharynx and esophagus. It merges with the thyroid gland and may represent a large thyroid neoplasm. benign or malignant.        CONCLUSION:  Cervical thoracic mass presumed of thyroidal origin estimated at approximately 11 cm x 6.7 cm x 6.6 cm extending from the hyoid bone within the right side of neck into the thoracic inlet then into the mediastinum to - ER record noting difficulty eating and swallowing however patient denies. Bedside swallow now. If passes - ok for meds.  And speech eval in AM    # Abdominal pain/nausea  - resolved s/p IV morphine  - abdominal exam benign including no tenderness and +BS Admitted with worsening pain, SOB, mild dysphagia, and \"neck swelling\" post diagnostic biopsy last week    History of Present Illness:  Vin Martinez is a a(n) [de-identified]year old female that I saw last week when she was found to have neck and superior medi • History of DVT (deep vein thrombosis)     axillary vein R (per [facility] Records)   • Hyperlipidemia    • Hypothyroid    • Nephrolithiasis    • Obesity, Class II, BMI 35-39.9    • PE [facility]pulmonary [facility]embolism) ([facility]     9/2015    • Problems with swallowing Review of Systems:  A 10-point review of systems was done with pertinent positives and negatives per the HPI. Physical Exam:   Blood pressure 154/55, pulse 99, temperature 97.5 °F (36.4 °C), temperature source Oral, resp.  rate 20, height 1.524 m (5'), w temporal bone measuring 1.5 x 0.5 cm. Small amount of intravenous air within the left jugular and external jugular vein tributaries on the left.     Dictated by: Walker Linda MD on 10/28/2018 at 16:00     Approved by: Walker Linda MD            Ct Stn/ -POSITIVE for malignant neoplasm, most consistent with anaplastic carcinoma.   (See comment.)        Electronically signed by Javon Huang MD on 10/26/2018 at  7:27 AM      Final Diagnosis Comment   Histologic sections show a malignant neoplasm with sarcomato Hypoxia     Facial swelling     Thyroid carcinoma (HCC)     Dehydration     Hypernatremia     Renal insufficiency     Leukocytosis, unspecified type     Superior vena cava syndrome    Impression-  1.  Large right neck/upper mediastinal mass from primary SD.1 - Boby Irby MD on 10/29/2018  1:17 PM  SD.2 - Boby Irby MD on 10/29/2018  1:28 PM                     D/C Summary     No notes of this type exist for this encounter.          Physical Therapy Notes (last 72 hours) (Notes from 10/28/2018 11:21 A • Thrombocytosis (HCC)    • Visual impairment        Past Surgical History  Past Surgical History:   Procedure Laterality Date   • COLONOSCOPY     • COLONOSCOPY,DIAGNOSTIC  ~2006    elsewhere negative per patient       SUBJECTIVE  \"What do I have to do? \" present, and amenable to OT/PT co-treat. HOB lowered, and pt transferred to EOB sitting with supervision and need for increased time. Pt performed sit>stand to RW with CGA.  She ambulated to the BR and toilted with OT assist. Pt ambulated 75ft with RW at [facility] Goal #6     Goal Comments: Goals established on 10/29/2018[KP.1]  Ongoing[KP.2] 10/31/18[KP. 3]     Attribution Key    KP. 1 - Dickson Carey on 10/31/2018  9:27 AM  KP.2 - Dickson Carey on 10/31/2018 10:46 AM  KP. 3 - Dickson Carey on 10/31/2018 10:52 A Physical Therapy Note signed by Rosalva Reyes at 10/29/2018  4:06 PM  Version 2 of 2    Author:  Rosalva Reyes Service:  Rehab Author Type:  PT Student    Filed:  10/29/2018  4:06 PM Date of Service:  10/29/2018  3:51 PM Status:  Signed    :  Alin • Visual impairment[KP. 2]        Past Surgical History[KP. 1]  Past Surgical History:   Procedure Laterality Date   • COLONOSCOPY     • COLONOSCOPY,DIAGNOSTIC  ~2006    elsewhere negative per patient[KP. 2]       HOME SITUATION[KP. 1]  Type of Home: House   H AM-PAC '6-Clicks' INPATIENT SHORT FORM - BASIC MOBILITY  How much difficulty does the patient currently have. ..[KP.1]  -   Turning over in bed (including adjusting bedclothes, sheets and blankets)?: None   -   Sitting down on and standing up from finding, communication regarding NC having not been placed upon start of session. Note that pt uses 2L O2 at home, and should be on O2 during ambulation.     Exercise/Education Provided:  Bed mobility  Body mechanics  Functional activity tolerated  Gait tra Number of Visits to Meet Established Goals: 5[KP. 2]      CURRENT GOALS    Goal #1 Patient is able to demonstrate supine - sit EOB @ level: modified independent     Goal #2 Patient is able to demonstrate transfers Sit to/from Stand at assistance level: mario Past Medical History[KP. 1]  Past Medical History:   Diagnosis Date   • Arrhythmia    • Deep vein thrombosis (HCC)    • Difficult intubation    • Disorder of thyroid    • Essential hypertension    • High blood pressure    • High cholesterol    • History of Upper extremity ROM and strength are within functional limits. Lower extremity ROM is within functional limits. Lower extremity strength is within functional limits. Grossly 4/5 bilaterally. BALANCE[KP.1]  Static Sitting: Fair  Dynamic Sitting:  Fa resumed sitting, with light-headedness resolving and O2 sats at 96%. She resumed standing, denies light-headedness. Pt ambulated into mireles at [facility], noting increase in symptoms with distance totalling 50ft before returning to EOB sitting.  O2 sats at 83%, cedrick Subacute rehab is recommended for 11-13 days. After this period of rehabilitation patient should achieve mod I level in functional mobility and transfers.     DISCHARGE RECOMMENDATIONS[KP.1]  PT Discharge Recommendations: Sub-acute rehabilitation(ELOS 11-1 History related to current admission: Pt was admitted from home on 10/28/2018 with thyroid carcinoma. Pt has a past medical history significant for recent diagnosis of thyroid CA with widespread metastasis, HTN, DVT/PE, HL, hypothyroidism.  See below for de independent with ADLs, cooking, cleaning. SUBJECTIVE   Pt stated, \"I need to use the bathroom. \"    Patient self-stated goal is to go home     OBJECTIVE  Precautions: None  Fall Risk: High fall risk    WEIGHT BEARING RESTRICTION  Weight Bearing Restric Supine to Sit : Minimum assistance  Sit to Stand: Minimum assistance    Skilled Therapy Provided: Pt was received supine in bed for session, pt t/f to EOB with min assist, therapist completed MMT and ROM on pt, pt was able to amb x1 in room and hallway and Overall Complexity LOW     OT Discharge Recommendations: Sub-acute rehabilitation(ELOS 11-13 days)  OT Device Recommendations: TBD  PLAN  OT Treatment Plan: Balance activities; Energy conservation/work simplification techniques;ADL training;IADL training;Co Author:  FRANK Miner Service:  Armin Chapman Author Type:  SPEECH-LANGUAGE PATHOLOGIST    Filed:  10/29/2018 11:37 AM Date of Service:  10/29/2018 11:33 AM Status:  Signed    :  FRANK Miner (SPEECH-LANGUAGE PATHOLOGIST)       Order r